# Patient Record
Sex: FEMALE | Race: WHITE | Employment: OTHER | URBAN - METROPOLITAN AREA
[De-identification: names, ages, dates, MRNs, and addresses within clinical notes are randomized per-mention and may not be internally consistent; named-entity substitution may affect disease eponyms.]

---

## 2023-01-25 ENCOUNTER — OFFICE VISIT (OUTPATIENT)
Dept: OBGYN CLINIC | Age: 73
End: 2023-01-25
Payer: MEDICARE

## 2023-01-25 ENCOUNTER — PREP FOR PROCEDURE (OUTPATIENT)
Dept: OBGYN CLINIC | Age: 73
End: 2023-01-25

## 2023-01-25 VITALS
DIASTOLIC BLOOD PRESSURE: 64 MMHG | WEIGHT: 151.8 LBS | SYSTOLIC BLOOD PRESSURE: 116 MMHG | BODY MASS INDEX: 27.94 KG/M2 | HEIGHT: 62 IN

## 2023-01-25 DIAGNOSIS — Z80.41 FAMILY HISTORY OF OVARIAN CANCER: ICD-10-CM

## 2023-01-25 DIAGNOSIS — N95.1 MENOPAUSAL AND FEMALE CLIMACTERIC STATES: ICD-10-CM

## 2023-01-25 DIAGNOSIS — R10.2 PELVIC PAIN IN FEMALE: ICD-10-CM

## 2023-01-25 DIAGNOSIS — N83.202 CYST OF LEFT OVARY: Primary | ICD-10-CM

## 2023-01-25 PROCEDURE — 1123F ACP DISCUSS/DSCN MKR DOCD: CPT | Performed by: STUDENT IN AN ORGANIZED HEALTH CARE EDUCATION/TRAINING PROGRAM

## 2023-01-25 PROCEDURE — 99203 OFFICE O/P NEW LOW 30 MIN: CPT | Performed by: STUDENT IN AN ORGANIZED HEALTH CARE EDUCATION/TRAINING PROGRAM

## 2023-01-25 RX ORDER — CETIRIZINE HYDROCHLORIDE 10 MG/1
TABLET ORAL DAILY
COMMUNITY

## 2023-01-25 RX ORDER — ACETAMINOPHEN 500 MG
TABLET ORAL
COMMUNITY

## 2023-01-25 RX ORDER — TOBRAMYCIN 3 MG/ML
SOLUTION/ DROPS OPHTHALMIC
COMMUNITY
Start: 2022-12-08

## 2023-01-25 RX ORDER — OMEPRAZOLE 20 MG/1
20 CAPSULE, DELAYED RELEASE ORAL DAILY
COMMUNITY
Start: 2022-12-08

## 2023-01-25 RX ORDER — LEVOTHYROXINE SODIUM 0.05 MG/1
50 TABLET ORAL DAILY
COMMUNITY
Start: 2022-12-09

## 2023-01-25 RX ORDER — SPIRONOLACTONE 50 MG/1
50 TABLET, FILM COATED ORAL DAILY
COMMUNITY
Start: 2022-12-08

## 2023-01-25 RX ORDER — NEOMYCIN/POLYMYXIN B/PRAMOXINE 3.5-10K-1
CREAM (GRAM) TOPICAL
COMMUNITY

## 2023-01-25 ASSESSMENT — PATIENT HEALTH QUESTIONNAIRE - PHQ9
SUM OF ALL RESPONSES TO PHQ QUESTIONS 1-9: 0
SUM OF ALL RESPONSES TO PHQ QUESTIONS 1-9: 0
2. FEELING DOWN, DEPRESSED OR HOPELESS: 0
SUM OF ALL RESPONSES TO PHQ9 QUESTIONS 1 & 2: 0
SUM OF ALL RESPONSES TO PHQ QUESTIONS 1-9: 0
1. LITTLE INTEREST OR PLEASURE IN DOING THINGS: 0
SUM OF ALL RESPONSES TO PHQ QUESTIONS 1-9: 0

## 2023-01-25 NOTE — PROGRESS NOTES
Elma Roa (: 1950) is a 81 y.o. Jerson Harish, New patient, here for evaluation of the following chief complaint(s):    New Patient      HPI:  Patient is a referral from PCP for ovarian cyst. Pt mother passed away from ovarian cancer 46yo. Denies having genetic testing done. Pt reports multiple cousins with unknown cancer and brain cancer. She is unsure how old she was when she went through menopause. Did HRT in the past for several years. Is still having hot flashes. Pt has been having pelvic pain, primarily in RLQ. She states that she had been having pelvic pain approximately 2 weeks before ultrasound. Pt had ultrasound on 12/15/22 showin.9 cm simple appearing left ovarian cyst.   Heterogeneous uterus with the endometrium not well visualized  Small free fluid within the pelvis  Right ovary now definitively visualized. Pt reoprts RLQ intermittent pain that is dull and occasionally sharp, pain started early 2022. Pt reports bowel movements are erratic - sometimes loose or constipated. Sexually active, no pain during intercourse. Denies vaginal bleeding since menopause. Only surgery is a postpartum tubal ligation. 2 kids, both vaginal deliveries. PMHx: hypothyroidism    ASSESSMENT/PLAN:  1. Cyst of left ovary  Overview:  23: discussed mgmt options including conservative mgmt with repeat US in 3 months vs surgical mgmt with bilateral salpingoopherectomy. Pt desires BSO 2/2 FMHx of ovarian cancer. Will plan for RA- laparoscopic BSO. 2. Family history of ovarian cancer  3. Pelvic pain in female  Overview:  23: RLQ intermittent pain that is dull and occasionally sharp, pain started early 2022. Pt reports bowel movements are erratic - sometimes loose or constipated. Sexually active, no pain during intercourse. Denies vaginal bleeding since menopause. +Carnet's sign.  Mild pelvic floor tenderness on exam, pelvic exam otherwise normal. Discussed that oopherectomy is unlikely to alleviate her pain. 4. Menopausal and female climacteric states  Overview:  1/25/23: pt reports hot flashes and nights sweats. Recommend Relizon. No follow-ups on file. SUBJECTIVE/OBJECTIVE:  /64 (Site: Left Upper Arm, Position: Sitting)   Ht 5' 2\" (1.575 m)   Wt 151 lb 12.8 oz (68.9 kg)   BMI 27.76 kg/m²      Past Medical History:   Diagnosis Date    GERD (gastroesophageal reflux disease)     Hyperlipidemia     Hypothyroidism     Vitamin D deficiency       Current Outpatient Medications   Medication Sig Dispense Refill    levothyroxine (SYNTHROID) 50 MCG tablet Take 50 mcg by mouth daily      omeprazole (PRILOSEC) 20 MG delayed release capsule Take 20 mg by mouth daily      spironolactone (ALDACTONE) 50 MG tablet Take 50 mg by mouth daily      tobramycin (TOBREX) 0.3 % ophthalmic solution INSTILL 1 DROP INTO AFFECTED EYE EVERY 4 HOURS      cetirizine (ZYRTEC) 10 MG tablet Take by mouth daily      Cholecalciferol (VITAMIN D3) 125 MCG (5000 UT) TABS Take by mouth      calcium carbonate (CALTRATE 600) 1500 (600 Ca) MG TABS tablet Take 600 mg by mouth daily      Multiple Vitamin (MULTIVITAMIN PO) Take by mouth      Omega-3 Krill Oil 500 MG CAPS Take by mouth      Misc Natural Products (GLUCOSAMINE CHOND CMP DOUBLE) TABS Take by mouth       No current facility-administered medications for this visit.      Allergies   Allergen Reactions    Cipro [Ciprofloxacin Hcl] Other (See Comments)     Nausea     Social History     Socioeconomic History    Marital status:      Spouse name: Not on file    Number of children: Not on file    Years of education: Not on file    Highest education level: Not on file   Occupational History    Not on file   Tobacco Use    Smoking status: Never    Smokeless tobacco: Never   Vaping Use    Vaping Use: Never used   Substance and Sexual Activity    Alcohol use: Never    Drug use: Never    Sexual activity: Yes     Partners: Male   Other Topics Concern    Not on file   Social History Narrative    Not on file     Social Determinants of Health     Financial Resource Strain: Not on file   Food Insecurity: Not on file   Transportation Needs: Not on file   Physical Activity: Not on file   Stress: Not on file   Social Connections: Not on file   Intimate Partner Violence: Not on file   Housing Stability: Not on file      Past Surgical History:   Procedure Laterality Date    CATARACT REMOVAL  2022    COLONOSCOPY      TUBAL LIGATION        OB History    Para Term  AB Living   2 2 2     2   SAB IAB Ectopic Molar Multiple Live Births             2      # Outcome Date GA Lbr Moy/2nd Weight Sex Delivery Anes PTL Lv   2 Term     M Vag-Spont   EFRA   1 Term     M Vag-Spont   EFRA          Review of Systems   Genitourinary:         RLQ pain   All other systems reviewed and are negative.    Physical Exam  Constitutional:       General: She is not in acute distress.     Appearance: Normal appearance. She is not ill-appearing.   Genitourinary:      Bladder and rectum normal.      Genitourinary Comments: Mild pelvic floor tenderness noted throughout. RLQ with +Carnets sign.      Right Labia: No rash, tenderness or skin changes.     Left Labia: No tenderness, skin changes or rash.     No vaginal discharge, tenderness or bleeding.      No vaginal prolapse present.     No vaginal atrophy present.       Right Adnexa: not tender and no mass present.     Left Adnexa: not tender and no mass present.     No cervical motion tenderness, discharge or lesion.      Uterus is not tender or irregular.      No uterine mass detected.  HENT:      Head: Normocephalic and atraumatic.      Nose: Nose normal.   Eyes:      Extraocular Movements: Extraocular movements intact.      Conjunctiva/sclera: Conjunctivae normal.   Cardiovascular:      Rate and Rhythm: Normal rate and regular rhythm.      Heart sounds: Normal heart sounds.   Pulmonary:      Effort: Pulmonary effort is normal. No respiratory distress.       Breath sounds: Normal breath sounds. Abdominal:      General: Abdomen is flat. There is no distension. Palpations: Abdomen is soft. There is no mass. Tenderness: There is no abdominal tenderness. Musculoskeletal:         General: Normal range of motion. Cervical back: Normal range of motion. Neurological:      General: No focal deficit present. Mental Status: She is alert and oriented to person, place, and time. Skin:     General: Skin is warm and dry. Psychiatric:         Mood and Affect: Mood normal.         Behavior: Behavior normal.   Exam conducted with a chaperone present. On this date 1/25/2023 I have spent 30 minutes reviewing previous notes, test results and face to face with the patient discussing the diagnosis and importance of compliance with the treatment plan as well as documenting on the day of the visit. An electronic signature was used to authenticate this note.     --Audrey Medrano MD

## 2023-01-25 NOTE — PATIENT INSTRUCTIONS
For hot flashes and night sweats:  Swedish bee pollen (Relizon)  Can get from website: [email or web address]

## 2023-02-28 ENCOUNTER — OFFICE VISIT (OUTPATIENT)
Dept: OBGYN CLINIC | Age: 73
End: 2023-02-28

## 2023-02-28 VITALS
WEIGHT: 151.8 LBS | BODY MASS INDEX: 27.94 KG/M2 | SYSTOLIC BLOOD PRESSURE: 102 MMHG | DIASTOLIC BLOOD PRESSURE: 64 MMHG | HEIGHT: 62 IN

## 2023-02-28 DIAGNOSIS — N83.202 CYST OF LEFT OVARY: ICD-10-CM

## 2023-02-28 DIAGNOSIS — Z80.41 FAMILY HISTORY OF OVARIAN CANCER: Primary | ICD-10-CM

## 2023-02-28 RX ORDER — SODIUM CHLORIDE 9 MG/ML
INJECTION, SOLUTION INTRAVENOUS PRN
Status: CANCELLED | OUTPATIENT
Start: 2023-02-28

## 2023-02-28 RX ORDER — SODIUM CHLORIDE 0.9 % (FLUSH) 0.9 %
5-40 SYRINGE (ML) INJECTION PRN
Status: CANCELLED | OUTPATIENT
Start: 2023-02-28

## 2023-02-28 RX ORDER — SODIUM CHLORIDE 0.9 % (FLUSH) 0.9 %
5-40 SYRINGE (ML) INJECTION EVERY 12 HOURS SCHEDULED
Status: CANCELLED | OUTPATIENT
Start: 2023-02-28

## 2023-02-28 RX ORDER — ONDANSETRON 4 MG/1
4 TABLET, FILM COATED ORAL EVERY 8 HOURS PRN
Qty: 30 TABLET | Refills: 0 | Status: SHIPPED | OUTPATIENT
Start: 2023-02-28

## 2023-02-28 RX ORDER — IBUPROFEN 800 MG/1
800 TABLET ORAL EVERY 8 HOURS PRN
Qty: 90 TABLET | Refills: 0 | Status: SHIPPED | OUTPATIENT
Start: 2023-02-28

## 2023-02-28 RX ORDER — OXYCODONE HYDROCHLORIDE 5 MG/1
5 TABLET ORAL EVERY 6 HOURS PRN
Qty: 20 TABLET | Refills: 0 | Status: SHIPPED | OUTPATIENT
Start: 2023-02-28 | End: 2023-03-07

## 2023-02-28 ASSESSMENT — PATIENT HEALTH QUESTIONNAIRE - PHQ9
1. LITTLE INTEREST OR PLEASURE IN DOING THINGS: 0
SUM OF ALL RESPONSES TO PHQ QUESTIONS 1-9: 0
SUM OF ALL RESPONSES TO PHQ9 QUESTIONS 1 & 2: 0
SUM OF ALL RESPONSES TO PHQ QUESTIONS 1-9: 0
2. FEELING DOWN, DEPRESSED OR HOPELESS: 0
SUM OF ALL RESPONSES TO PHQ QUESTIONS 1-9: 0
SUM OF ALL RESPONSES TO PHQ QUESTIONS 1-9: 0

## 2023-02-28 NOTE — H&P (VIEW-ONLY)
HPI:  Patient is a referral from PCP for ovarian cyst. Pt mother passed away from ovarian cancer 44yo. Denies having genetic testing done. Pt reports multiple cousins with unknown cancer and brain cancer. She is unsure how old she was when she went through menopause. Did HRT in the past for several years. Is still having hot flashes. Pt has been having pelvic pain, primarily in RLQ. She states that she had been having pelvic pain approximately 2 weeks before ultrasound. Pt had ultrasound on 12/15/22 showin.9 cm simple appearing left ovarian cyst.   Heterogeneous uterus with the endometrium not well visualized  Small free fluid within the pelvis  Right ovary now definitively visualized. Pt reoprts RLQ intermittent pain that is dull and occasionally sharp, pain started early 2022. Pt reports bowel movements are erratic - sometimes loose or constipated. Sexually active, no pain during intercourse. Denies vaginal bleeding since menopause. Only surgery is a postpartum tubal ligation. 2 kids, both vaginal deliveries. PMHx: hypothyroidism     ASSESSMENT/PLAN:  1. Cyst of left ovary  Overview:  23: discussed mgmt options including conservative mgmt with repeat US in 3 months vs surgical mgmt with bilateral salpingoopherectomy. Pt desires BSO 2/2 FMHx of ovarian cancer. Will plan for RA- laparoscopic BSO. 2. Family history of ovarian cancer  3. Pelvic pain in female  Overview:  23: RLQ intermittent pain that is dull and occasionally sharp, pain started early 2022. Pt reports bowel movements are erratic - sometimes loose or constipated. Sexually active, no pain during intercourse. Denies vaginal bleeding since menopause. +Carnet's sign. Mild pelvic floor tenderness on exam, pelvic exam otherwise normal. Discussed that oopherectomy is unlikely to alleviate her pain. 4. Menopausal and female climacteric states  Overview:  23: pt reports hot flashes and nights sweats.  Recommend Adi. No follow-ups on file. SUBJECTIVE/OBJECTIVE:  /64 (Site: Left Upper Arm, Position: Sitting)   Ht 5' 2\" (1.575 m)   Wt 151 lb 12.8 oz (68.9 kg)   BMI 27.76 kg/m²       Past Medical History        Past Medical History:   Diagnosis Date    GERD (gastroesophageal reflux disease)      Hyperlipidemia      Hypothyroidism      Vitamin D deficiency           Current Facility-Administered Medications          Current Outpatient Medications   Medication Sig Dispense Refill    levothyroxine (SYNTHROID) 50 MCG tablet Take 50 mcg by mouth daily        omeprazole (PRILOSEC) 20 MG delayed release capsule Take 20 mg by mouth daily        spironolactone (ALDACTONE) 50 MG tablet Take 50 mg by mouth daily        tobramycin (TOBREX) 0.3 % ophthalmic solution INSTILL 1 DROP INTO AFFECTED EYE EVERY 4 HOURS        cetirizine (ZYRTEC) 10 MG tablet Take by mouth daily        Cholecalciferol (VITAMIN D3) 125 MCG (5000 UT) TABS Take by mouth        calcium carbonate (CALTRATE 600) 1500 (600 Ca) MG TABS tablet Take 600 mg by mouth daily        Multiple Vitamin (MULTIVITAMIN PO) Take by mouth        Omega-3 Krill Oil 500 MG CAPS Take by mouth        Misc Natural Products (GLUCOSAMINE CHOND CMP DOUBLE) TABS Take by mouth          No current facility-administered medications for this visit.                Allergies   Allergen Reactions    Cipro [Ciprofloxacin Hcl] Other (See Comments)       Nausea      Social History               Socioeconomic History    Marital status:        Spouse name: Not on file    Number of children: Not on file    Years of education: Not on file    Highest education level: Not on file   Occupational History    Not on file   Tobacco Use    Smoking status: Never    Smokeless tobacco: Never   Vaping Use    Vaping Use: Never used   Substance and Sexual Activity    Alcohol use: Never    Drug use: Never    Sexual activity: Yes       Partners: Male   Other Topics Concern    Not on file   Social History Narrative    Not on file      Social Determinants of Health      Financial Resource Strain: Not on file   Food Insecurity: Not on file   Transportation Needs: Not on file   Physical Activity: Not on file   Stress: Not on file   Social Connections: Not on file   Intimate Partner Violence: Not on file   Housing Stability: Not on file         Past Surgical History         Past Surgical History:   Procedure Laterality Date    CATARACT REMOVAL   2022    COLONOSCOPY        TUBAL LIGATION                               OB History    Para Term  AB Living   2 2 2     2   SAB IAB Ectopic Molar Multiple Live Births              2       # Outcome Date GA Lbr Moy/2nd Weight Sex Delivery Anes PTL Lv   2 Term         M Vag-Spont     EFRA   1 Term         M Vag-Spont     EFRA            Review of Systems   Genitourinary:         RLQ pain   All other systems reviewed and are negative. Physical Exam  Constitutional:       General: She is not in acute distress. Appearance: Normal appearance. She is not ill-appearing. Genitourinary:      Bladder and rectum normal.      Genitourinary Comments: Mild pelvic floor tenderness noted throughout. RLQ with +Carnets sign. Right Labia: No rash, tenderness or skin changes. Left Labia: No tenderness, skin changes or rash. No vaginal discharge, tenderness or bleeding. No vaginal prolapse present. No vaginal atrophy present. Right Adnexa: not tender and no mass present. Left Adnexa: not tender and no mass present. No cervical motion tenderness, discharge or lesion. Uterus is not tender or irregular. No uterine mass detected. HENT:      Head: Normocephalic and atraumatic. Nose: Nose normal.   Eyes:      Extraocular Movements: Extraocular movements intact. Conjunctiva/sclera: Conjunctivae normal.   Cardiovascular:      Rate and Rhythm: Normal rate and regular rhythm. Heart sounds: Normal heart sounds. Pulmonary:      Effort: Pulmonary effort is normal. No respiratory distress. Breath sounds: Normal breath sounds. Abdominal:      General: Abdomen is flat. There is no distension. Palpations: Abdomen is soft. There is no mass. Tenderness: There is no abdominal tenderness. Musculoskeletal:         General: Normal range of motion. Cervical back: Normal range of motion. Neurological:      General: No focal deficit present. Mental Status: She is alert and oriented to person, place, and time. Skin:     General: Skin is warm and dry. Psychiatric:         Mood and Affect: Mood normal.         Behavior: Behavior normal.   Exam conducted with a chaperone present. On this date 1/25/2023 I have spent 30 minutes reviewing previous notes, test results and face to face with the patient discussing the diagnosis and importance of compliance with the treatment plan as well as documenting on the day of the visit. An electronic signature was used to authenticate this note.      --Viviana Marie MD

## 2023-02-28 NOTE — PROGRESS NOTES
Preop Visit    Pt presents for a preop visit. She is scheduled for a Bilateral Salpingo-oophorectomy. Her history, meds, and allergies were reviewed. The procedure was reviewed in detail as well as the risks of bleeding, infection, DVT and potential surgical complications involving the bladder, ureters, colon or intestines. Also the alternatives,  benefits, recovery and follow-up. Prevention of SSI discussed as indicated. All of her questions were answered. Exam:  Lungs CTAB  Heart RRR    See the hospital H&P as well as prior chart for details.     Alicia Mcintyre MD  February 28, 2023

## 2023-02-28 NOTE — H&P
History Narrative    Not on file      Social Determinants of Health      Financial Resource Strain: Not on file   Food Insecurity: Not on file   Transportation Needs: Not on file   Physical Activity: Not on file   Stress: Not on file   Social Connections: Not on file   Intimate Partner Violence: Not on file   Housing Stability: Not on file         Past Surgical History         Past Surgical History:   Procedure Laterality Date    CATARACT REMOVAL   2022    COLONOSCOPY        TUBAL LIGATION                               OB History    Para Term  AB Living   2 2 2     2   SAB IAB Ectopic Molar Multiple Live Births              2       # Outcome Date GA Lbr Moy/2nd Weight Sex Delivery Anes PTL Lv   2 Term         M Vag-Spont     EFRA   1 Term         M Vag-Spont     EFRA            Review of Systems   Genitourinary:         RLQ pain   All other systems reviewed and are negative. Physical Exam  Constitutional:       General: She is not in acute distress. Appearance: Normal appearance. She is not ill-appearing. Genitourinary:      Bladder and rectum normal.      Genitourinary Comments: Mild pelvic floor tenderness noted throughout. RLQ with +Carnets sign. Right Labia: No rash, tenderness or skin changes. Left Labia: No tenderness, skin changes or rash. No vaginal discharge, tenderness or bleeding. No vaginal prolapse present. No vaginal atrophy present. Right Adnexa: not tender and no mass present. Left Adnexa: not tender and no mass present. No cervical motion tenderness, discharge or lesion. Uterus is not tender or irregular. No uterine mass detected. HENT:      Head: Normocephalic and atraumatic. Nose: Nose normal.   Eyes:      Extraocular Movements: Extraocular movements intact. Conjunctiva/sclera: Conjunctivae normal.   Cardiovascular:      Rate and Rhythm: Normal rate and regular rhythm. Heart sounds: Normal heart sounds.

## 2023-03-06 RX ORDER — OMEPRAZOLE 20 MG/1
20 CAPSULE, DELAYED RELEASE ORAL DAILY
COMMUNITY
Start: 2021-12-06 | End: 2023-03-06

## 2023-03-06 NOTE — PROGRESS NOTES
Patient verified name and . Order for consent  found in EHR and matches case posting; patient verifies procedure. Type 3 surgery, phone assessment complete. Orders not received. Labs per surgeon: unknown, no orders  Labs per anesthesia protocol: BMP, CBC at outpt lab;  type and screen on dos. Patient answered medical/surgical history questions at their best of ability. All prior to admission medications documented in EPIC. Patient instructed to take the following medications the day of surgery according to anesthesia guidelines with a small sip of water: Zyrtec, Synthroid, Omeprazole. On the day before surgery please take Acetaminophen 1000mg in the morning and then again before bed. You may substitute for Tylenol 650 mg. Hold all vitamins 7 days prior to surgery and NSAIDS 5 days prior to surgery. Prescription meds to hold: none  Patient instructed on the following:    > Arrive at A Entrance, time of arrival to be called the day before by 1700  > NPO after midnight, unless otherwise indicated, including gum, mints, and ice chips  > Responsible adult must drive patient to the hospital, stay during surgery, and patient will need supervision 24 hours after anesthesia  > Use Hibiclens in shower the night before surgery and on the morning of surgery  > All piercings must be removed prior to arrival.    > Leave all valuables (money and jewelry) at home but bring insurance card and ID on DOS.   > You may be required to pay a deductible or co-pay on the day of your procedure. You can pre-pay by calling [phone] if your surgery is at the [facility] or [phone] if your surgery is at the [facility]. > Do not wear make-up, nail polish, lotions, cologne, perfumes, powders, or oil on skin. Artificial nails are not permitted.

## 2023-03-08 ENCOUNTER — HOSPITAL ENCOUNTER (OUTPATIENT)
Dept: LAB | Age: 73
Discharge: HOME | End: 2023-03-11
Payer: MEDICARE

## 2023-03-08 ENCOUNTER — ANESTHESIA EVENT (OUTPATIENT)
Dept: SURGERY | Age: 73
End: 2023-03-08
Payer: MEDICARE

## 2023-03-08 DIAGNOSIS — Z01.818 PRE-OP TESTING: ICD-10-CM

## 2023-03-08 LAB
ANION GAP SERPL CALC-SCNC: 6 MMOL/L (ref 2–11)
BUN SERPL-MCNC: 13 MG/DL (ref 8–23)
CALCIUM SERPL-MCNC: 9.1 MG/DL (ref 8.3–10.4)
CHLORIDE SERPL-SCNC: 109 MMOL/L (ref 101–110)
CO2 SERPL-SCNC: 26 MMOL/L (ref 21–32)
CREAT SERPL-MCNC: 0.89 MG/DL (ref 0.6–1)
ERYTHROCYTE [DISTWIDTH] IN BLOOD BY AUTOMATED COUNT: 12.4 % (ref 11.9–14.6)
GLUCOSE SERPL-MCNC: 84 MG/DL (ref 65–100)
HCT VFR BLD AUTO: 37.1 % (ref 35.8–46.3)
HGB BLD-MCNC: 12.3 G/DL (ref 11.7–15.4)
MCH RBC QN AUTO: 30.4 PG (ref 26.1–32.9)
MCHC RBC AUTO-ENTMCNC: 33.2 G/DL (ref 31.4–35)
MCV RBC AUTO: 91.8 FL (ref 82–102)
NRBC # BLD: 0 K/UL (ref 0–0.2)
PLATELET # BLD AUTO: 236 K/UL (ref 150–450)
PMV BLD AUTO: 10.8 FL (ref 9.4–12.3)
POTASSIUM SERPL-SCNC: 4.3 MMOL/L (ref 3.5–5.1)
RBC # BLD AUTO: 4.04 M/UL (ref 4.05–5.2)
SODIUM SERPL-SCNC: 141 MMOL/L (ref 133–143)
WBC # BLD AUTO: 6.9 K/UL (ref 4.3–11.1)

## 2023-03-08 PROCEDURE — 36415 COLL VENOUS BLD VENIPUNCTURE: CPT

## 2023-03-08 PROCEDURE — 85027 COMPLETE CBC AUTOMATED: CPT

## 2023-03-08 PROCEDURE — 80048 BASIC METABOLIC PNL TOTAL CA: CPT

## 2023-03-09 ENCOUNTER — ANESTHESIA (OUTPATIENT)
Dept: SURGERY | Age: 73
End: 2023-03-09
Payer: MEDICARE

## 2023-03-09 ENCOUNTER — HOSPITAL ENCOUNTER (OUTPATIENT)
Age: 73
Setting detail: OUTPATIENT SURGERY
Discharge: HOME | End: 2023-03-09
Attending: STUDENT IN AN ORGANIZED HEALTH CARE EDUCATION/TRAINING PROGRAM | Admitting: STUDENT IN AN ORGANIZED HEALTH CARE EDUCATION/TRAINING PROGRAM
Payer: MEDICARE

## 2023-03-09 VITALS
BODY MASS INDEX: 27.51 KG/M2 | TEMPERATURE: 97.9 F | OXYGEN SATURATION: 100 % | WEIGHT: 149.47 LBS | HEART RATE: 51 BPM | DIASTOLIC BLOOD PRESSURE: 48 MMHG | SYSTOLIC BLOOD PRESSURE: 102 MMHG | RESPIRATION RATE: 18 BRPM | HEIGHT: 62 IN

## 2023-03-09 DIAGNOSIS — Z01.818 PRE-OP TESTING: Primary | ICD-10-CM

## 2023-03-09 LAB
ABO + RH BLD: NORMAL
BLOOD GROUP ANTIBODIES SERPL: NORMAL
SPECIMEN EXP DATE BLD: NORMAL

## 2023-03-09 PROCEDURE — 3700000001 HC ADD 15 MINUTES (ANESTHESIA): Performed by: STUDENT IN AN ORGANIZED HEALTH CARE EDUCATION/TRAINING PROGRAM

## 2023-03-09 PROCEDURE — 6360000002 HC RX W HCPCS: Performed by: STUDENT IN AN ORGANIZED HEALTH CARE EDUCATION/TRAINING PROGRAM

## 2023-03-09 PROCEDURE — S2900 ROBOTIC SURGICAL SYSTEM: HCPCS | Performed by: STUDENT IN AN ORGANIZED HEALTH CARE EDUCATION/TRAINING PROGRAM

## 2023-03-09 PROCEDURE — 7100000010 HC PHASE II RECOVERY - FIRST 15 MIN: Performed by: STUDENT IN AN ORGANIZED HEALTH CARE EDUCATION/TRAINING PROGRAM

## 2023-03-09 PROCEDURE — 6370000000 HC RX 637 (ALT 250 FOR IP): Performed by: STUDENT IN AN ORGANIZED HEALTH CARE EDUCATION/TRAINING PROGRAM

## 2023-03-09 PROCEDURE — 2580000003 HC RX 258: Performed by: STUDENT IN AN ORGANIZED HEALTH CARE EDUCATION/TRAINING PROGRAM

## 2023-03-09 PROCEDURE — 86900 BLOOD TYPING SEROLOGIC ABO: CPT

## 2023-03-09 PROCEDURE — 2500000003 HC RX 250 WO HCPCS: Performed by: NURSE ANESTHETIST, CERTIFIED REGISTERED

## 2023-03-09 PROCEDURE — 3600000019 HC SURGERY ROBOT ADDTL 15MIN: Performed by: STUDENT IN AN ORGANIZED HEALTH CARE EDUCATION/TRAINING PROGRAM

## 2023-03-09 PROCEDURE — 58661 LAPAROSCOPY REMOVE ADNEXA: CPT | Performed by: STUDENT IN AN ORGANIZED HEALTH CARE EDUCATION/TRAINING PROGRAM

## 2023-03-09 PROCEDURE — 88305 TISSUE EXAM BY PATHOLOGIST: CPT

## 2023-03-09 PROCEDURE — 88307 TISSUE EXAM BY PATHOLOGIST: CPT

## 2023-03-09 PROCEDURE — 3700000000 HC ANESTHESIA ATTENDED CARE: Performed by: STUDENT IN AN ORGANIZED HEALTH CARE EDUCATION/TRAINING PROGRAM

## 2023-03-09 PROCEDURE — 2709999900 HC NON-CHARGEABLE SUPPLY: Performed by: STUDENT IN AN ORGANIZED HEALTH CARE EDUCATION/TRAINING PROGRAM

## 2023-03-09 PROCEDURE — 6360000002 HC RX W HCPCS: Performed by: NURSE ANESTHETIST, CERTIFIED REGISTERED

## 2023-03-09 PROCEDURE — 7100000001 HC PACU RECOVERY - ADDTL 15 MIN: Performed by: STUDENT IN AN ORGANIZED HEALTH CARE EDUCATION/TRAINING PROGRAM

## 2023-03-09 PROCEDURE — 3600000009 HC SURGERY ROBOT BASE: Performed by: STUDENT IN AN ORGANIZED HEALTH CARE EDUCATION/TRAINING PROGRAM

## 2023-03-09 PROCEDURE — 7100000011 HC PHASE II RECOVERY - ADDTL 15 MIN: Performed by: STUDENT IN AN ORGANIZED HEALTH CARE EDUCATION/TRAINING PROGRAM

## 2023-03-09 PROCEDURE — 7100000000 HC PACU RECOVERY - FIRST 15 MIN: Performed by: STUDENT IN AN ORGANIZED HEALTH CARE EDUCATION/TRAINING PROGRAM

## 2023-03-09 RX ORDER — EPHEDRINE SULFATE/0.9% NACL/PF 50 MG/5 ML
SYRINGE (ML) INTRAVENOUS PRN
Status: DISCONTINUED | OUTPATIENT
Start: 2023-03-09 | End: 2023-03-09 | Stop reason: SDUPTHER

## 2023-03-09 RX ORDER — SODIUM CHLORIDE 9 MG/ML
INJECTION, SOLUTION INTRAVENOUS PRN
Status: DISCONTINUED | OUTPATIENT
Start: 2023-03-09 | End: 2023-03-09 | Stop reason: HOSPADM

## 2023-03-09 RX ORDER — SODIUM CHLORIDE, SODIUM LACTATE, POTASSIUM CHLORIDE, CALCIUM CHLORIDE 600; 310; 30; 20 MG/100ML; MG/100ML; MG/100ML; MG/100ML
INJECTION, SOLUTION INTRAVENOUS CONTINUOUS
Status: DISCONTINUED | OUTPATIENT
Start: 2023-03-09 | End: 2023-03-09 | Stop reason: HOSPADM

## 2023-03-09 RX ORDER — OXYCODONE HYDROCHLORIDE 5 MG/1
10 TABLET ORAL PRN
Status: COMPLETED | OUTPATIENT
Start: 2023-03-09 | End: 2023-03-09

## 2023-03-09 RX ORDER — SODIUM CHLORIDE 0.9 % (FLUSH) 0.9 %
5-40 SYRINGE (ML) INJECTION PRN
Status: DISCONTINUED | OUTPATIENT
Start: 2023-03-09 | End: 2023-03-09 | Stop reason: HOSPADM

## 2023-03-09 RX ORDER — ACETAMINOPHEN 500 MG
1000 TABLET ORAL ONCE
Status: COMPLETED | OUTPATIENT
Start: 2023-03-09 | End: 2023-03-09

## 2023-03-09 RX ORDER — NEOSTIGMINE METHYLSULFATE 1 MG/ML
INJECTION, SOLUTION INTRAVENOUS PRN
Status: DISCONTINUED | OUTPATIENT
Start: 2023-03-09 | End: 2023-03-09 | Stop reason: SDUPTHER

## 2023-03-09 RX ORDER — DIPHENHYDRAMINE HYDROCHLORIDE 50 MG/ML
12.5 INJECTION INTRAMUSCULAR; INTRAVENOUS
Status: DISCONTINUED | OUTPATIENT
Start: 2023-03-09 | End: 2023-03-09 | Stop reason: HOSPADM

## 2023-03-09 RX ORDER — LABETALOL HYDROCHLORIDE 5 MG/ML
10 INJECTION, SOLUTION INTRAVENOUS
Status: DISCONTINUED | OUTPATIENT
Start: 2023-03-09 | End: 2023-03-09 | Stop reason: HOSPADM

## 2023-03-09 RX ORDER — GLYCOPYRROLATE 0.2 MG/ML
INJECTION INTRAMUSCULAR; INTRAVENOUS PRN
Status: DISCONTINUED | OUTPATIENT
Start: 2023-03-09 | End: 2023-03-09 | Stop reason: SDUPTHER

## 2023-03-09 RX ORDER — DEXAMETHASONE SODIUM PHOSPHATE 10 MG/ML
INJECTION INTRAMUSCULAR; INTRAVENOUS PRN
Status: DISCONTINUED | OUTPATIENT
Start: 2023-03-09 | End: 2023-03-09 | Stop reason: SDUPTHER

## 2023-03-09 RX ORDER — ROCURONIUM BROMIDE 10 MG/ML
INJECTION, SOLUTION INTRAVENOUS PRN
Status: DISCONTINUED | OUTPATIENT
Start: 2023-03-09 | End: 2023-03-09 | Stop reason: SDUPTHER

## 2023-03-09 RX ORDER — SODIUM CHLORIDE 0.9 % (FLUSH) 0.9 %
5-40 SYRINGE (ML) INJECTION EVERY 12 HOURS SCHEDULED
Status: DISCONTINUED | OUTPATIENT
Start: 2023-03-09 | End: 2023-03-09 | Stop reason: HOSPADM

## 2023-03-09 RX ORDER — FENTANYL CITRATE 50 UG/ML
100 INJECTION, SOLUTION INTRAMUSCULAR; INTRAVENOUS
Status: DISCONTINUED | OUTPATIENT
Start: 2023-03-09 | End: 2023-03-09 | Stop reason: HOSPADM

## 2023-03-09 RX ORDER — LIDOCAINE HYDROCHLORIDE 20 MG/ML
INJECTION, SOLUTION EPIDURAL; INFILTRATION; INTRACAUDAL; PERINEURAL PRN
Status: DISCONTINUED | OUTPATIENT
Start: 2023-03-09 | End: 2023-03-09 | Stop reason: SDUPTHER

## 2023-03-09 RX ORDER — HYDRALAZINE HYDROCHLORIDE 20 MG/ML
10 INJECTION INTRAMUSCULAR; INTRAVENOUS
Status: DISCONTINUED | OUTPATIENT
Start: 2023-03-09 | End: 2023-03-09 | Stop reason: HOSPADM

## 2023-03-09 RX ORDER — ONDANSETRON 2 MG/ML
INJECTION INTRAMUSCULAR; INTRAVENOUS PRN
Status: DISCONTINUED | OUTPATIENT
Start: 2023-03-09 | End: 2023-03-09 | Stop reason: SDUPTHER

## 2023-03-09 RX ORDER — HALOPERIDOL 5 MG/ML
1 INJECTION INTRAMUSCULAR
Status: DISCONTINUED | OUTPATIENT
Start: 2023-03-09 | End: 2023-03-09 | Stop reason: HOSPADM

## 2023-03-09 RX ORDER — FENTANYL CITRATE 50 UG/ML
INJECTION, SOLUTION INTRAMUSCULAR; INTRAVENOUS PRN
Status: DISCONTINUED | OUTPATIENT
Start: 2023-03-09 | End: 2023-03-09 | Stop reason: SDUPTHER

## 2023-03-09 RX ORDER — PROPOFOL 10 MG/ML
INJECTION, EMULSION INTRAVENOUS PRN
Status: DISCONTINUED | OUTPATIENT
Start: 2023-03-09 | End: 2023-03-09 | Stop reason: SDUPTHER

## 2023-03-09 RX ORDER — OXYCODONE HYDROCHLORIDE 5 MG/1
5 TABLET ORAL PRN
Status: COMPLETED | OUTPATIENT
Start: 2023-03-09 | End: 2023-03-09

## 2023-03-09 RX ORDER — APREPITANT 40 MG/1
40 CAPSULE ORAL ONCE
Status: COMPLETED | OUTPATIENT
Start: 2023-03-09 | End: 2023-03-09

## 2023-03-09 RX ORDER — MIDAZOLAM HYDROCHLORIDE 1 MG/ML
2 INJECTION INTRAMUSCULAR; INTRAVENOUS
Status: DISCONTINUED | OUTPATIENT
Start: 2023-03-09 | End: 2023-03-09 | Stop reason: HOSPADM

## 2023-03-09 RX ORDER — PROCHLORPERAZINE EDISYLATE 5 MG/ML
5 INJECTION INTRAMUSCULAR; INTRAVENOUS
Status: DISCONTINUED | OUTPATIENT
Start: 2023-03-09 | End: 2023-03-09 | Stop reason: HOSPADM

## 2023-03-09 RX ORDER — IPRATROPIUM BROMIDE AND ALBUTEROL SULFATE 2.5; .5 MG/3ML; MG/3ML
1 SOLUTION RESPIRATORY (INHALATION)
Status: DISCONTINUED | OUTPATIENT
Start: 2023-03-09 | End: 2023-03-09 | Stop reason: HOSPADM

## 2023-03-09 RX ORDER — LIDOCAINE HYDROCHLORIDE 10 MG/ML
1 INJECTION, SOLUTION INFILTRATION; PERINEURAL
Status: DISCONTINUED | OUTPATIENT
Start: 2023-03-09 | End: 2023-03-09 | Stop reason: HOSPADM

## 2023-03-09 RX ORDER — PROMETHAZINE HYDROCHLORIDE 12.5 MG/1
12.5 TABLET ORAL ONCE
Status: COMPLETED | OUTPATIENT
Start: 2023-03-09 | End: 2023-03-09

## 2023-03-09 RX ADMIN — OXYCODONE HYDROCHLORIDE 5 MG: 5 TABLET ORAL at 12:41

## 2023-03-09 RX ADMIN — PROMETHAZINE HYDROCHLORIDE 12.5 MG: 12.5 TABLET ORAL at 13:39

## 2023-03-09 RX ADMIN — Medication 2000 MG: at 10:54

## 2023-03-09 RX ADMIN — FENTANYL CITRATE 50 MCG: 50 INJECTION, SOLUTION INTRAMUSCULAR; INTRAVENOUS at 11:09

## 2023-03-09 RX ADMIN — ROCURONIUM BROMIDE 40 MG: 10 INJECTION, SOLUTION INTRAVENOUS at 10:41

## 2023-03-09 RX ADMIN — GLYCOPYRROLATE 0.8 MG: 0.2 INJECTION INTRAMUSCULAR; INTRAVENOUS at 12:03

## 2023-03-09 RX ADMIN — PROPOFOL 200 MG: 10 INJECTION, EMULSION INTRAVENOUS at 10:41

## 2023-03-09 RX ADMIN — ONDANSETRON 4 MG: 2 INJECTION INTRAMUSCULAR; INTRAVENOUS at 10:50

## 2023-03-09 RX ADMIN — SODIUM CHLORIDE, SODIUM LACTATE, POTASSIUM CHLORIDE, AND CALCIUM CHLORIDE: 600; 310; 30; 20 INJECTION, SOLUTION INTRAVENOUS at 08:50

## 2023-03-09 RX ADMIN — APREPITANT 40 MG: 40 CAPSULE ORAL at 08:41

## 2023-03-09 RX ADMIN — Medication 10 MG: at 10:45

## 2023-03-09 RX ADMIN — PHENYLEPHRINE HYDROCHLORIDE 50 MCG: 0.1 INJECTION, SOLUTION INTRAVENOUS at 11:31

## 2023-03-09 RX ADMIN — ROCURONIUM BROMIDE 10 MG: 10 INJECTION, SOLUTION INTRAVENOUS at 11:44

## 2023-03-09 RX ADMIN — Medication 10 MG: at 10:54

## 2023-03-09 RX ADMIN — DEXAMETHASONE SODIUM PHOSPHATE 8 MG: 10 INJECTION INTRAMUSCULAR; INTRAVENOUS at 10:50

## 2023-03-09 RX ADMIN — GLYCOPYRROLATE 0.1 MG: 0.2 INJECTION INTRAMUSCULAR; INTRAVENOUS at 11:02

## 2023-03-09 RX ADMIN — FENTANYL CITRATE 100 MCG: 50 INJECTION, SOLUTION INTRAMUSCULAR; INTRAVENOUS at 10:41

## 2023-03-09 RX ADMIN — Medication 5 MG: at 12:03

## 2023-03-09 RX ADMIN — PHENYLEPHRINE HYDROCHLORIDE 50 MCG: 0.1 INJECTION, SOLUTION INTRAVENOUS at 11:58

## 2023-03-09 RX ADMIN — ACETAMINOPHEN 1000 MG: 500 TABLET, FILM COATED ORAL at 08:41

## 2023-03-09 RX ADMIN — PHENYLEPHRINE HYDROCHLORIDE 50 MCG: 0.1 INJECTION, SOLUTION INTRAVENOUS at 11:03

## 2023-03-09 RX ADMIN — SODIUM CHLORIDE, SODIUM LACTATE, POTASSIUM CHLORIDE, AND CALCIUM CHLORIDE: 600; 310; 30; 20 INJECTION, SOLUTION INTRAVENOUS at 10:36

## 2023-03-09 RX ADMIN — LIDOCAINE HYDROCHLORIDE 10 MG: 20 INJECTION, SOLUTION EPIDURAL; INFILTRATION; INTRACAUDAL; PERINEURAL at 10:41

## 2023-03-09 RX ADMIN — FENTANYL CITRATE 25 MCG: 50 INJECTION, SOLUTION INTRAMUSCULAR; INTRAVENOUS at 12:09

## 2023-03-09 RX ADMIN — FENTANYL CITRATE 25 MCG: 50 INJECTION, SOLUTION INTRAMUSCULAR; INTRAVENOUS at 12:06

## 2023-03-09 ASSESSMENT — PAIN DESCRIPTION - DESCRIPTORS
DESCRIPTORS: SORE
DESCRIPTORS: SORE

## 2023-03-09 ASSESSMENT — PAIN DESCRIPTION - ORIENTATION
ORIENTATION: LOWER
ORIENTATION: LOWER

## 2023-03-09 ASSESSMENT — PAIN SCALES - GENERAL
PAINLEVEL_OUTOF10: 6
PAINLEVEL_OUTOF10: 4

## 2023-03-09 ASSESSMENT — PAIN DESCRIPTION - LOCATION
LOCATION: ABDOMEN
LOCATION: ABDOMEN

## 2023-03-09 NOTE — OP NOTE
Operative Note      Patient: Yris Resendez  YOB: 1950  MRN: [id number]    Date of Procedure: 3/9/2023    Pre-Op Diagnosis: Bilateral ovarian cysts [N83.201, N83.202]  Pelvic pain in female [R10.2]    Post-Op Diagnosis: Same       Procedure(s):  ROBOTIC ASSISTED LAPAROSCOPIC BILATERAL SALPINGOOPHERECTOMY    Surgeon(s):  Eliu Beck MD    Assistant:   * No surgical staff found *    Anesthesia: General    Estimated Blood Loss (mL): Minimal    Complications: None    Specimens:   ID Type Source Tests Collected by Time Destination   A : BILATERAL FALLOPIAN UBES, BILATERAL OVARIES Tissue Tissue SURGICAL PATHOLOGY Eliu Beck MD 3/9/2023 1200        Implants:  * No implants in log *      Drains:   Urinary Catheter 03/09/23 Berrios (Active)       Findings:   Normal stomach, liver edge shows mild fibrosis. Normal external female genitalia, vagina, and cervix. Uterus sounds to 6cm. Bilateral fallopian tubes with evidence of prior tubal ligation. Right ovary normal. Uterus normal.  Left ovary with 1-2cm simple cyst.    Detailed Description of Procedure: The patient was taken to the operating room where general anesthesia was found to be adequate. Patient was prepped and draped in normal sterile fashion. A Berrios catheter was placed into the urethra. A weighted speculum was placed in the vagina. The anterior lip of the cervix was grasped with a single-tooth tenaculum. The uterus was sounded to 6 cm. The cervix was dilated with Rony dilators and an Vcare uterine manipulator was inserted in the endometrial cavity for means of manipulation. All other instruments were then removed from the vagina. The patient was flattened so that attention could be turned to the abdomen. A 8 mm skin incision was made infraumbilically. The Veress needle was then inserted at a 45-degree angle. Intraperitoneal placement was confirmed with a water-filled syringe and a drop in cO2 pressure with insufflation.  Opening pressure was 2 mmHg. The abdomen was then insufflated to 3 liters of CO2 gas. Once the abdomen was insufflated, a 12 mm skin incision was made in the LUQ in the midclavicular line. The insufflating port was inserted under direct visualization using an Optiscope. Intraperitoneal placement was confirmed again with the scope. A small injury to large bowel epiploica noted, hemostatic. Bowel in the area was explored and no bowel injury was noted. Two more 8 mm skin incisions were made in the right lower quadrant and left lower quadrant, approximately 10 to 12 cm from the umbilicus on either side. Two robotic ports were then placed in these incisions under direct visualization as well as one infraumbilically. Hemostasis was assured throughout. Survey of the patient's abdomen revealed the above findings. At this point, the robot was then docked and I unscrubbed to take over at the robotic console. Bipolar marylands and monopolar scissors were used to complete the surgery. Attention was turned towards the right ovary. Monopolar scissors were used to enter the retroperitoneum. A window was created underneath the ovarian vessels. The ovarian vessels were cauterized and transected. The dissection was then carried towards the uterus were the uteroovarian vessels and fallopian tube were cauterized and transected, freeing the tube and ovary. The fallopian tube and ovary were handed off to pathology. The same procedure was repeated on the left. The left ovary and tube were placed in a 5mm bag and removed through the LUQ port. Ovarian cyst remained unruptured. Excellent hemostasis was assured. The pelvis was irrigated and hemostasis was again reassured. The trocars were then removed. 4-0 Monocryl was used to close the skin incisions. Steri-strips and Mastisol were applied over the incisions. Patient tolerated the procedure well. Sponge, lap, and needle counts were correct x2.  The patient was taken to recovery in stable condition.      Electronically signed by Zay Vaca MD on 3/9/2023 at 12:15 PM

## 2023-03-09 NOTE — ANESTHESIA PRE PROCEDURE
Department of Anesthesiology  Preprocedure Note       Name:  Tenisha Quintanilla   Age:  81 y.o.  :  1950                                          MRN:  [id number]         Date:  3/9/2023      Surgeon: Salomón Ramirez):  Kosta Johnson MD    Procedure: Procedure(s):  ROBOTIC ASSISTED LAPAROSCOPIC BILATERAL SALPINGOOPHERECTOMY    Medications prior to admission:   Prior to Admission medications    Medication Sig Start Date End Date Taking?  Authorizing Provider   Carboxymethylcellulose Sodium (REFRESH LIQUIGEL OP) Apply to eye   Yes Historical Provider, MD   ibuprofen (ADVIL;MOTRIN) 800 MG tablet Take 1 tablet by mouth every 8 hours as needed for Pain 23   Kosta Johnson MD   ondansetron (ZOFRAN) 4 MG tablet Take 1 tablet by mouth every 8 hours as needed for Nausea or Vomiting  Patient not taking: Reported on 3/9/2023 2/28/23   Kosta Johnson MD   levothyroxine (SYNTHROID) 50 MCG tablet Take 50 mcg by mouth daily 22   Historical Provider, MD   omeprazole (PRILOSEC) 20 MG delayed release capsule Take 20 mg by mouth daily 22   Historical Provider, MD   spironolactone (ALDACTONE) 50 MG tablet Take 50 mg by mouth at bedtime 22   Historical Provider, MD   tobramycin (TOBREX) 0.3 % ophthalmic solution INSTILL 1 DROP INTO AFFECTED EYE EVERY 4 HOURS  Patient not taking: No sig reported 22   Historical Provider, MD   cetirizine (ZYRTEC) 10 MG tablet Take 10 mg by mouth at bedtime    Historical Provider, MD   Cholecalciferol (VITAMIN D3) 125 MCG (5000 UT) TABS Take by mouth    Historical Provider, MD   calcium carbonate 1500 (600 Ca) MG TABS tablet Take 600 mg by mouth daily    Historical Provider, MD   Multiple Vitamin (MULTIVITAMIN PO) Take by mouth    Historical Provider, MD   Omega-3 Krill Oil 500 MG CAPS Take by mouth    Historical Provider, MD   Misc Natural Products (GLUCOSAMINE CHOND CMP DOUBLE) TABS Take by mouth    Historical Provider, MD       Current medications:    Current Facility-Administered Medications   Medication Dose Route Frequency Provider Last Rate Last Admin    lidocaine 1 % injection 1 mL  1 mL IntraDERmal Once PRN Leandro Mcleod MD        fentaNYL (SUBLIMAZE) injection 100 mcg  100 mcg IntraVENous Once PRN Leandro Mcleod MD        sodium chloride flush 0.9 % injection 5-40 mL  5-40 mL IntraVENous 2 times per day Leandro Mcleod MD        sodium chloride flush 0.9 % injection 5-40 mL  5-40 mL IntraVENous PRN Leandro Mcleod MD        0.9 % sodium chloride infusion   IntraVENous PRN Leandro Mcleod MD        midazolam (VERSED) injection 2 mg  2 mg IntraVENous Once PRN Leandro Mcleod MD        sodium chloride flush 0.9 % injection 5-40 mL  5-40 mL IntraVENous 2 times per day Srinath Gonsales MD        sodium chloride flush 0.9 % injection 5-40 mL  5-40 mL IntraVENous PRN Srinath Gonsales MD        0.9 % sodium chloride infusion   IntraVENous PRN Srinath Gonsales MD        ceFAZolin (ANCEF) 2000 mg in sterile water 20 mL IV syringe  2,000 mg IntraVENous On Call to Delores Welsh MD        lactated ringers IV soln infusion   IntraVENous Continuous Leandro Mcleod  mL/hr at 03/09/23 0850 New Bag at 03/09/23 0850       Allergies:     Allergies   Allergen Reactions    Cipro [Ciprofloxacin Hcl] Other (See Comments)     Nausea    Amoxicillin-Pot Clavulanate Nausea And Vomiting    Azithromycin Other (See Comments)     Dizziness  lightheadedness       Problem List:    Patient Active Problem List   Diagnosis Code    Family history of ovarian cancer Z80.41    Cyst of left ovary N83.202    Pelvic pain in female R10.2    Menopausal and female climacteric states N95.1       Past Medical History:        Diagnosis Date    Arthritis     hands    Dry eye     GERD (gastroesophageal reflux disease)     Hyperlipidemia     Hypothyroidism     Palpitations     normal stress 9/27/21    Vitamin D deficiency        Past Surgical History:        Procedure Laterality Date    BUNIONECTOMY Bilateral     CATARACT REMOVAL Bilateral 01/01/2022    COLONOSCOPY      TUBAL LIGATION         Social History:    Social History     Tobacco Use    Smoking status: Never    Smokeless tobacco: Never   Substance Use Topics    Alcohol use: Never                                Counseling given: Not Answered      Vital Signs (Current):   Vitals:    03/06/23 1306 03/09/23 0831   BP:  (!) 128/51   Pulse:  55   Resp:  16   Temp:  97.7 °F (36.5 °C)   SpO2:  99%   Weight: 151 lb (68.5 kg) 149 lb 7.6 oz (67.8 kg)   Height: 5' 2\" (1.575 m)                                               BP Readings from Last 3 Encounters:   03/09/23 (!) 128/51   02/28/23 102/64   01/25/23 116/64       NPO Status: Time of last liquid consumption: 0600 (sip with med)                        Time of last solid consumption: 1900                        Date of last liquid consumption: 03/09/23                        Date of last solid food consumption: 03/08/23    BMI:   Wt Readings from Last 3 Encounters:   03/09/23 149 lb 7.6 oz (67.8 kg)   02/28/23 151 lb 12.8 oz (68.9 kg)   01/25/23 151 lb 12.8 oz (68.9 kg)     Body mass index is 27.34 kg/m². CBC:   Lab Results   Component Value Date/Time    WBC 6.9 03/08/2023 12:15 PM    RBC 4.04 03/08/2023 12:15 PM    HGB 12.3 03/08/2023 12:15 PM    HCT 37.1 03/08/2023 12:15 PM    MCV 91.8 03/08/2023 12:15 PM    RDW 12.4 03/08/2023 12:15 PM     03/08/2023 12:15 PM       CMP:   Lab Results   Component Value Date/Time     03/08/2023 12:15 PM    K 4.3 03/08/2023 12:15 PM     03/08/2023 12:15 PM    CO2 26 03/08/2023 12:15 PM    BUN 13 03/08/2023 12:15 PM    CREATININE 0.89 03/08/2023 12:15 PM    LABGLOM >60 03/08/2023 12:15 PM    GLUCOSE 84 03/08/2023 12:15 PM    CALCIUM 9.1 03/08/2023 12:15 PM       POC Tests: No results for input(s): POCGLU, POCNA, POCK, POCCL, POCBUN, POCHEMO, POCHCT in the last 72 hours.     Coags: No results found for: PROTIME, INR, APTT    HCG (If Applicable): No results found for: PREGTESTUR, PREGSERUM, HCG, HCGQUANT     ABGs: No results found for: PHART, PO2ART, DSP5EVR, DNI3GYI, BEART, G4SMMKCY     Type & Screen (If Applicable):  No results found for: LABABO, LABRH    Drug/Infectious Status (If Applicable):  No results found for: HIV, HEPCAB    COVID-19 Screening (If Applicable): No results found for: COVID19        Anesthesia Evaluation  Patient summary reviewed and Nursing notes reviewed no history of anesthetic complications:   Airway: Mallampati: II  TM distance: >3 FB   Neck ROM: full  Mouth opening: > = 3 FB   Dental: normal exam         Pulmonary:Negative Pulmonary ROS and normal exam                               Cardiovascular:  Exercise tolerance: good (>4 METS),   (+) hyperlipidemia               ROS comment: Stress 2021: ECG   Resting ECG is normal.   There was no ST segment deviation noted during stress. There were SVT arrhythmias during stress. No arrhythmias during recovery. Arrhythmias were significant. Neuro/Psych:   Negative Neuro/Psych ROS              GI/Hepatic/Renal:   (+) GERD: well controlled,           Endo/Other:    (+) hypothyroidism: arthritis:., .                 Abdominal:             Vascular: negative vascular ROS. Other Findings:           Anesthesia Plan      general     ASA 2       Induction: intravenous. Anesthetic plan and risks discussed with patient.                         Thompson Pineda MD   3/9/2023

## 2023-03-09 NOTE — ANESTHESIA POSTPROCEDURE EVALUATION
Department of Anesthesiology  Postprocedure Note    Patient: Thi Abraham  MRN: [id number]  YOB: 1950  Date of evaluation: 3/9/2023      Procedure Summary     Date: 03/09/23 Room / Location: [facility] 07 / [facility] [place]    Anesthesia Start: 1031 Anesthesia Stop: 1218    Procedure: ROBOTIC ASSISTED LAPAROSCOPIC BILATERAL SALPINGOOPHERECTOMY (Bilateral: Abdomen) Diagnosis:       Bilateral ovarian cysts      Pelvic pain in female      (Bilateral ovarian cysts [N83.201, N83.202])      (Pelvic pain in female [R10.2])    Surgeons: Michelle Dillard MD Responsible Provider: Emigdio Neely MD    Anesthesia Type: general ASA Status: 2          Anesthesia Type: No value filed. Shubham Phase I: Shubham Score: 10    Shubham Phase II:        Anesthesia Post Evaluation    Patient location during evaluation: PACU  Patient participation: complete - patient participated  Level of consciousness: awake and alert  Airway patency: patent  Nausea: well controlled. Complications: no  Cardiovascular status: acceptable.   Respiratory status: acceptable  Hydration status: stable

## 2023-03-09 NOTE — INTERVAL H&P NOTE
Update History & Physical    The patient's History and Physical of March 9, 2023 was reviewed with the patient and I examined the patient. There was no change. The surgical site was confirmed by the patient and me. Plan: The risks, benefits, expected outcome, and alternative to the recommended procedure have been discussed with the patient. Patient understands and wants to proceed with the procedure.      Electronically signed by Martin Jc MD on 3/9/2023 at 10:19 AM

## 2023-03-13 NOTE — RESULT ENCOUNTER NOTE
Please call patient and inform her of benign pathology from her surgery. She had a benign ovarian cyst on her left ovary (serous cystadenoma). Thanks!

## 2023-03-14 ENCOUNTER — TELEPHONE (OUTPATIENT)
Dept: OBGYN CLINIC | Age: 73
End: 2023-03-14

## 2023-03-14 NOTE — TELEPHONE ENCOUNTER
Attempted to call pt but had to LVM. Pt did not identify herself on VM so did not feel comfortable leaving results. Pt active on [employer] so advised on VM that I will send [employer] message - encouraged to call to discuss further or w/ any questions.

## 2023-03-14 NOTE — TELEPHONE ENCOUNTER
----- Message from Aye Aquino MD sent at 3/13/2023 11:30 AM EDT -----  Please call patient and inform her of benign pathology from her surgery. She had a benign ovarian cyst on her left ovary (serous cystadenoma). Thanks!

## 2023-03-23 ENCOUNTER — OFFICE VISIT (OUTPATIENT)
Dept: OBGYN CLINIC | Age: 73
End: 2023-03-23

## 2023-03-23 VITALS
WEIGHT: 148.6 LBS | DIASTOLIC BLOOD PRESSURE: 80 MMHG | SYSTOLIC BLOOD PRESSURE: 120 MMHG | BODY MASS INDEX: 27.34 KG/M2 | HEIGHT: 62 IN

## 2023-03-23 DIAGNOSIS — Z98.890 POST-OPERATIVE STATE: Primary | ICD-10-CM

## 2023-03-23 PROCEDURE — 99024 POSTOP FOLLOW-UP VISIT: CPT | Performed by: STUDENT IN AN ORGANIZED HEALTH CARE EDUCATION/TRAINING PROGRAM

## 2023-03-23 ASSESSMENT — PATIENT HEALTH QUESTIONNAIRE - PHQ9
SUM OF ALL RESPONSES TO PHQ QUESTIONS 1-9: 0
SUM OF ALL RESPONSES TO PHQ9 QUESTIONS 1 & 2: 0
2. FEELING DOWN, DEPRESSED OR HOPELESS: 0
SUM OF ALL RESPONSES TO PHQ QUESTIONS 1-9: 0
1. LITTLE INTEREST OR PLEASURE IN DOING THINGS: 0

## 2023-03-23 NOTE — PROGRESS NOTES
Kal Hernandez presents for postop visit from [address] about 2 weeks ago. Doing well postoperatively. without any bleeding. Fever: No Voiding well: Yes. Bowel movements OK: Yes. Exam: A&OX3, NAD. Abdomen:  Non tender Incisions clean, dry, intact    Discussed pathology report which was benign, ovarian cyst is serous cystadenoma    A/P. Stable Post op condition. Gradually increase activity. Resumption of sexual activity is  encouraged at this time. Follow up as needed.

## (undated) DEVICE — APPLICATOR MEDICATED 26 CC SOLUTION HI LT ORNG CHLORAPREP

## (undated) DEVICE — TISSUE RETRIEVAL SYSTEM: Brand: INZII RETRIEVAL SYSTEM

## (undated) DEVICE — PAD PT POS 36 IN SURGYPAD DISP

## (undated) DEVICE — AIRSEAL 5 MM ACCESS PORT AND LOW PROFILE OBTURATOR WITH BLADELESS OPTICAL TIP, 120 MM LENGTH: Brand: AIRSEAL

## (undated) DEVICE — GLOVE SURG SZ 65 L12IN FNGR THK79MIL GRN LTX FREE

## (undated) DEVICE — SOLUTION IRRIG 1000ML STRL H2O USP PLAS POUR BTL

## (undated) DEVICE — ARM DRAPE

## (undated) DEVICE — AIRSEAL 12 MM ACCESS PORT AND PALM GRIP OBTURATOR WITH BLADELESS OPTICAL TIP, 120 MM LENGTH: Brand: AIRSEAL

## (undated) DEVICE — Z DUPLICATE USE 2246581 COVER MPLR TIP CRV SCIS ACC DA VINCI

## (undated) DEVICE — CANNULA SEAL

## (undated) DEVICE — SUTURE MCRYL SZ 4-0 L27IN ABSRB UD L19MM PS-2 1/2 CIR PRIM Y426H

## (undated) DEVICE — GLOVE SURG SZ 65 THK91MIL LTX FREE SYN POLYISOPRENE

## (undated) DEVICE — SYRINGE MED 10ML LUERLOCK TIP W/O SFTY DISP

## (undated) DEVICE — TOTAL TRAY, DB, 100% SILI FOLEY, 16FR 10: Brand: MEDLINE

## (undated) DEVICE — ROBOTIC HYSTERECTOMY: Brand: MEDLINE INDUSTRIES, INC.

## (undated) DEVICE — COLUMN DRAPE

## (undated) DEVICE — CANISTER, RIGID, 2000CC: Brand: MEDLINE INDUSTRIES, INC.

## (undated) DEVICE — SOLUTION IV 1000ML LAC RINGERS PH 6.5 INJ USP VIAFLX PLAS

## (undated) DEVICE — BLADELESS OBTURATOR: Brand: WECK VISTA